# Patient Record
Sex: FEMALE | Race: WHITE | HISPANIC OR LATINO | URBAN - METROPOLITAN AREA
[De-identification: names, ages, dates, MRNs, and addresses within clinical notes are randomized per-mention and may not be internally consistent; named-entity substitution may affect disease eponyms.]

---
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
## 2023-05-04 NOTE — PRE-ANESTHESIA EVALUATION PEDIATRIC - NSANTHROSMUSNEURD_GEN_P_CORE
Negative Complex Repair And Transposition Flap Text: The defect edges were debeveled with a #15 scalpel blade.  The primary defect was closed partially with a complex linear closure.  Given the location of the remaining defect, shape of the defect and the proximity to free margins a transposition flap was deemed most appropriate for complete closure of the defect.  Using a sterile surgical marker, an appropriate advancement flap was drawn incorporating the defect and placing the expected incisions within the relaxed skin tension lines where possible. The area thus outlined was incised deep to adipose tissue with a #15 scalpel blade. The skin margins were undermined to an appropriate distance in all directions utilizing iris scissors and carried over to close the primary defect.

## 2024-08-21 ENCOUNTER — EMERGENCY (EMERGENCY)
Facility: HOSPITAL | Age: 19
LOS: 0 days | Discharge: HOME | End: 2024-08-21
Attending: EMERGENCY MEDICINE
Payer: COMMERCIAL

## 2024-08-21 VITALS
HEART RATE: 85 BPM | TEMPERATURE: 98 F | DIASTOLIC BLOOD PRESSURE: 66 MMHG | SYSTOLIC BLOOD PRESSURE: 114 MMHG | OXYGEN SATURATION: 98 % | RESPIRATION RATE: 19 BRPM

## 2024-08-21 DIAGNOSIS — S61.203A UNSPECIFIED OPEN WOUND OF LEFT MIDDLE FINGER WITHOUT DAMAGE TO NAIL, INITIAL ENCOUNTER: ICD-10-CM

## 2024-08-21 DIAGNOSIS — Z98.890 OTHER SPECIFIED POSTPROCEDURAL STATES: Chronic | ICD-10-CM
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
## 2024-08-21 DIAGNOSIS — S61.205A UNSPECIFIED OPEN WOUND OF LEFT RING FINGER WITHOUT DAMAGE TO NAIL, INITIAL ENCOUNTER: ICD-10-CM
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
## 2024-08-21 PROCEDURE — 99282 EMERGENCY DEPT VISIT SF MDM: CPT

## 2024-08-21 PROCEDURE — 99284 EMERGENCY DEPT VISIT MOD MDM: CPT

## 2024-08-21 RX ORDER — SILVER SULFADIAZINE 1 %
1 CREAM (GRAM) TOPICAL ONCE
Refills: 0 | Status: DISCONTINUED | OUTPATIENT
Start: 2024-08-21 | End: 2024-08-21

## 2024-08-21 NOTE — ED PROVIDER NOTE - NS ED MD DISPO DISCHARGE CCDA
Ben,     Patient/Caregiver provided printed discharge information.

## 2024-08-21 NOTE — ED PROVIDER NOTE - CLINICAL SUMMARY MEDICAL DECISION MAKING FREE TEXT BOX
19-year-old female with no significant past medical history presents with injury to left hand.  States that she was holding her dog's leash when the dog pulled and caused blistering and rubbing of her skin from her 2nd through 4th fingers.  Tdap up-to-date.  No bleeding.  Able to fully range fingers and no weakness, numbness or tingling.  On exam nontoxic, vital signs noted, does have what appears to be superficial avulsion of the skin on the volar side of the middle phalanx of the third and fourth digits with no bleeding or foreign body.  Also blistering to the volar side of the tip of the fifth finger.  Also blistering to the dorsal side of the middle phalanx/DIP area of the second finger.  Full range of motion and strength in extension and flexion including with FDS and FDP isolated.  Neurovascular intact in radial, median and ulnar distribution.  Wound care discussed and will prescribe silver sulfadiazine.  Advised to follow-up with burn clinic.  Return precautions discussed.  In my opinion, based on current evaluation and results, an acute medical or surgical emergency does not appear to be occurring at this time and I feel that the pt is stable for further outpatient work up and/or treatment.  No signs of infection, nvi.

## 2024-08-21 NOTE — ED PROVIDER NOTE - ATTENDING APP SHARED VISIT CONTRIBUTION OF CARE
Patient stating her pharmacy told her, to contact her doctor, to get in touch with her insurance.  They need authorization.    hydrOXYzine (ATARAX) 25 MG tablet    [id number] - [place], [place] - [address] [place] [address] AT [facility] [address]    see MDM

## 2024-08-21 NOTE — ED PROVIDER NOTE - NSFOLLOWUPCLINICS_GEN_ALL_ED_FT
[facility]  Burn  [address], [address]  [place] [address]  Phone: [phone]  Fax:   Follow Up Time: 4-6 Days

## 2024-08-21 NOTE — ED PROVIDER NOTE - PATIENT PORTAL LINK FT
You can access the FollowMyHealth Patient Portal offered by [facility] by registering at the following website: [email or web address]. By joining [employer]’s FollowMyHealth portal, you will also be able to view your health information using other applications (apps) compatible with our system.

## 2024-08-21 NOTE — ED PROVIDER NOTE - OBJECTIVE STATEMENT
Patient sustained blisters to her left third and fourth fingers from a dog leash.  Patient was walking dog and the dog pulled and caused the blistering which deroofed and now has open wounds.  Patient states she is up-to-date with tetanus.  Incident happened earlier today

## 2024-08-21 NOTE — ED PROVIDER NOTE - PHYSICAL EXAMINATION
Left hand:.  There is deroofed blistered wounds on the palmar aspect of digits #3 and 4.  No signs of infection.  Full range of motion.  Neurovascular intact distally.

## 2024-08-21 NOTE — ED ADULT NURSE NOTE - AVIAN FLU EXPOSURE
[facility] outreach to patient to inform them of Rx refill. Patient says they have been notified by [employer] and will be going to  Losartan Rx from [facility] during her lunch this afternoon.    Elier Wallace at 10:45 AM on 9/10/2021  [facility]  Phone [phone]     No

## 2024-08-29 ENCOUNTER — APPOINTMENT (OUTPATIENT)
Dept: BURN CARE | Facility: CLINIC | Age: 19
End: 2024-08-29